# Patient Record
Sex: MALE | Race: BLACK OR AFRICAN AMERICAN | NOT HISPANIC OR LATINO | URBAN - METROPOLITAN AREA
[De-identification: names, ages, dates, MRNs, and addresses within clinical notes are randomized per-mention and may not be internally consistent; named-entity substitution may affect disease eponyms.]

---

## 2022-04-18 ENCOUNTER — OFFICE VISIT (OUTPATIENT)
Dept: URBAN - METROPOLITAN AREA CLINIC 107 | Facility: CLINIC | Age: 32
End: 2022-04-18
Payer: COMMERCIAL

## 2022-04-18 ENCOUNTER — LAB OUTSIDE AN ENCOUNTER (OUTPATIENT)
Dept: URBAN - METROPOLITAN AREA CLINIC 107 | Facility: CLINIC | Age: 32
End: 2022-04-18

## 2022-04-18 VITALS
TEMPERATURE: 97.2 F | HEIGHT: 69 IN | SYSTOLIC BLOOD PRESSURE: 119 MMHG | BODY MASS INDEX: 20.32 KG/M2 | RESPIRATION RATE: 18 BRPM | WEIGHT: 137.2 LBS | DIASTOLIC BLOOD PRESSURE: 80 MMHG | HEART RATE: 83 BPM

## 2022-04-18 DIAGNOSIS — K92.1 HEMATOCHEZIA: ICD-10-CM

## 2022-04-18 DIAGNOSIS — R93.5 ABNORMAL CT OF THE ABDOMEN: ICD-10-CM

## 2022-04-18 DIAGNOSIS — R19.7 DIARRHEA, UNSPECIFIED TYPE: ICD-10-CM

## 2022-04-18 DIAGNOSIS — R10.30 LOWER ABDOMINAL PAIN: ICD-10-CM

## 2022-04-18 PROCEDURE — 99204 OFFICE O/P NEW MOD 45 MIN: CPT | Performed by: INTERNAL MEDICINE

## 2022-04-18 NOTE — HPI-TODAY'S VISIT:
Patient presents today for initial eval ration.  He reports several weeks ago he had the onset of lower abdominal pain, diarrhea and blood in his stool.  Blood described as bright red.  Lower abdominal pain was crampy in nature.  He was having diarrhea every hour.  He had some nausea and vomiting.  No sick contacts or other obvious triggers.  He went to the emergency department where evaluation was performed with imaging and labs.  These are reviewed with him.  He had a normal CBC.  His creatinine was 1.51.  His LFTs were normal.  CT scan showed diffuse colitis.  He was given antibiotics and pain medicines.  He did not have a stool specimen performed.  He reports that after for 5 days he had some improvement.  He does continue to have a lot of fecal urgency although the bleeding has resolved.  Pain seems to be improved also.  He denies any progressive weight loss.  No other prodromal symptoms.  No mouth ulcers or skin lesions.

## 2022-05-20 ENCOUNTER — CLAIMS CREATED FROM THE CLAIM WINDOW (OUTPATIENT)
Dept: URBAN - METROPOLITAN AREA CLINIC 4 | Facility: CLINIC | Age: 32
End: 2022-05-20
Payer: COMMERCIAL

## 2022-05-20 ENCOUNTER — OFFICE VISIT (OUTPATIENT)
Dept: URBAN - METROPOLITAN AREA SURGERY CENTER 25 | Facility: SURGERY CENTER | Age: 32
End: 2022-05-20

## 2022-05-20 ENCOUNTER — CLAIMS CREATED FROM THE CLAIM WINDOW (OUTPATIENT)
Dept: URBAN - METROPOLITAN AREA SURGERY CENTER 25 | Facility: SURGERY CENTER | Age: 32
End: 2022-05-20
Payer: COMMERCIAL

## 2022-05-20 DIAGNOSIS — R19.7 DIARRHEA: ICD-10-CM

## 2022-05-20 DIAGNOSIS — K51.518 LEFT SIDED ULCERATIVE COLITIS WITH OTHER COMPLICATION: ICD-10-CM

## 2022-05-20 DIAGNOSIS — K51.919 ULCERATIVE COLITIS, UNSPECIFIED WITH UNSPECIFIED COMPLICATIONS: ICD-10-CM

## 2022-05-20 DIAGNOSIS — K63.89 CYST OF DUODENUM: ICD-10-CM

## 2022-05-20 DIAGNOSIS — R93.3 ABN FINDINGS-GI TRACT: ICD-10-CM

## 2022-05-20 DIAGNOSIS — K51.511 LEFT SIDED ULCERATIVE COLITIS WITH RECTAL BLEEDING: ICD-10-CM

## 2022-05-20 PROCEDURE — 45380 COLONOSCOPY AND BIOPSY: CPT | Performed by: INTERNAL MEDICINE

## 2022-05-20 PROCEDURE — 88305 TISSUE EXAM BY PATHOLOGIST: CPT | Performed by: PATHOLOGY

## 2022-05-20 PROCEDURE — G8907 PT DOC NO EVENTS ON DISCHARG: HCPCS | Performed by: INTERNAL MEDICINE

## 2022-06-22 ENCOUNTER — OFFICE VISIT (OUTPATIENT)
Dept: URBAN - METROPOLITAN AREA CLINIC 107 | Facility: CLINIC | Age: 32
End: 2022-06-22
Payer: COMMERCIAL

## 2022-06-22 ENCOUNTER — DASHBOARD ENCOUNTERS (OUTPATIENT)
Age: 32
End: 2022-06-22

## 2022-06-22 VITALS
SYSTOLIC BLOOD PRESSURE: 113 MMHG | HEIGHT: 69 IN | DIASTOLIC BLOOD PRESSURE: 76 MMHG | RESPIRATION RATE: 18 BRPM | BODY MASS INDEX: 19.85 KG/M2 | TEMPERATURE: 97.8 F | HEART RATE: 62 BPM | WEIGHT: 134 LBS

## 2022-06-22 DIAGNOSIS — K92.1 HEMATOCHEZIA: ICD-10-CM

## 2022-06-22 DIAGNOSIS — R19.7 DIARRHEA, UNSPECIFIED TYPE: ICD-10-CM

## 2022-06-22 DIAGNOSIS — K51.20 ULCERATIVE PROCTITIS WITHOUT COMPLICATION: ICD-10-CM

## 2022-06-22 PROBLEM — 3951002: Status: ACTIVE | Noted: 2022-06-22

## 2022-06-22 PROCEDURE — 99214 OFFICE O/P EST MOD 30 MIN: CPT | Performed by: INTERNAL MEDICINE

## 2022-06-22 RX ORDER — MESALAMINE 1.2 G/1
2 TABLETS WITH A MEAL TABLET, DELAYED RELEASE ORAL ONCE A DAY
Qty: 180 TABLET | Refills: 2 | OUTPATIENT
Start: 2022-06-22 | End: 2023-03-19

## 2022-06-22 NOTE — HPI-TODAY'S VISIT:
Patient returns today in follow-up.  He reports he is feeling better.  He is having 1 bowel movement a day sometimes 2.  Denies any blood.  Denies urgency.  No further abdominal pain.  His colonoscopy results are reviewed with him.  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  Right colon biopsies were negative.  He denies any family history of inflammatory bowel disease.  He is on no medications currently.  He is not taking a bunch of NSAIDs.

## 2022-09-13 ENCOUNTER — OFFICE VISIT (OUTPATIENT)
Dept: URBAN - METROPOLITAN AREA CLINIC 107 | Facility: CLINIC | Age: 32
End: 2022-09-13